# Patient Record
Sex: FEMALE | Race: WHITE | URBAN - METROPOLITAN AREA
[De-identification: names, ages, dates, MRNs, and addresses within clinical notes are randomized per-mention and may not be internally consistent; named-entity substitution may affect disease eponyms.]

---

## 2022-01-21 ENCOUNTER — OFFICE VISIT (OUTPATIENT)
Dept: URBAN - METROPOLITAN AREA CLINIC 71 | Facility: CLINIC | Age: 67
End: 2022-01-21
Payer: MEDICARE

## 2022-01-21 DIAGNOSIS — H02.889 MEIBOMIAN GLAND DYSFUNCTION OF EYE: ICD-10-CM

## 2022-01-21 DIAGNOSIS — E11.9 TYPE 2 DIABETES MELLITUS WITHOUT COMPLICATIONS: Primary | ICD-10-CM

## 2022-01-21 PROCEDURE — 99204 OFFICE O/P NEW MOD 45 MIN: CPT

## 2022-01-21 PROCEDURE — 92133 CPTRZD OPH DX IMG PST SGM ON: CPT

## 2022-01-21 ASSESSMENT — INTRAOCULAR PRESSURE
OS: 14
OD: 9

## 2022-01-21 NOTE — IMPRESSION/PLAN
Impression: Open angle with borderline findings, low risk, bilateral: H40.013. OCT performed today. IOP today: 9/14 mm Hg via [facility]
C/D ratio: 0.6 OU Pachy: Unknown OD, OS
IOP adjustment factor: Unknown OD, OS Tmax: Unknown OD, OS Last RNFL: 92/91 um Gonio: None OD, OS Family history: Mother (verify again) Surgeries: None Plan: Patient educated on today's findings. Low suspicion at this time. Continue to monitor yearly w/ DFE and OCT.

## 2022-01-26 NOTE — IMPRESSION/PLAN
Impression: Type 2 diabetes mellitus without complications: E75.3. Plan: Patient educated on today's findings. Patient to continue taking medications as directed, monitoring blood sugar regularly and attend follow-up appts w/ PCP. Patient to be monitored yearly w/ DFE and [employer].

## 2022-01-26 NOTE — IMPRESSION/PLAN
Impression: Meibomian gland dysfunction of eye: H02.889 Bilateral. Plan: Recommend OTC artifical tear use 2-4x daily w/ emphasis on QAM and QHS doses. Recommend warm compress w/ lid massage. If symptoms continue/worsen will consider prescription drug therapy.

## 2022-01-27 ENCOUNTER — OFFICE VISIT (OUTPATIENT)
Dept: URBAN - METROPOLITAN AREA CLINIC 71 | Facility: CLINIC | Age: 67
End: 2022-01-27
Payer: COMMERCIAL

## 2022-01-27 DIAGNOSIS — H25.813 COMBINED FORMS OF AGE-RELATED CATARACT, BILATERAL: ICD-10-CM

## 2022-01-27 DIAGNOSIS — H52.03 HYPERMETROPIA, BILATERAL: Primary | ICD-10-CM

## 2022-01-27 DIAGNOSIS — H40.013 OPEN ANGLE WITH BORDERLINE FINDINGS, LOW RISK, BILATERAL: ICD-10-CM

## 2022-01-27 PROCEDURE — 92012 INTRM OPH EXAM EST PATIENT: CPT | Performed by: OPTOMETRIST

## 2022-01-27 ASSESSMENT — VISUAL ACUITY
OS: 20/20
OD: 20/20

## 2022-01-27 ASSESSMENT — INTRAOCULAR PRESSURE
OS: 12
OD: 11

## 2022-01-27 NOTE — IMPRESSION/PLAN
Impression: Open angle with borderline findings, low risk, bilateral: H40.013. Dr. Jimmy Bejarano is monitoring Plan: Continue care with Dr. Valentino Grip as advised.

## 2023-07-07 ENCOUNTER — OFFICE VISIT (OUTPATIENT)
Dept: URBAN - METROPOLITAN AREA CLINIC 71 | Facility: CLINIC | Age: 68
End: 2023-07-07
Payer: MEDICARE

## 2023-07-07 DIAGNOSIS — H40.013 OPEN ANGLE WITH BORDERLINE FINDINGS, LOW RISK, BILATERAL: Primary | ICD-10-CM

## 2023-07-07 DIAGNOSIS — H25.813 COMBINED FORMS OF AGE-RELATED CATARACT, BILATERAL: ICD-10-CM

## 2023-07-07 DIAGNOSIS — H04.123 DRY EYE SYNDROME OF BILATERAL LACRIMAL GLANDS: ICD-10-CM

## 2023-07-07 DIAGNOSIS — H52.03 HYPERMETROPIA, BILATERAL: ICD-10-CM

## 2023-07-07 PROCEDURE — 92134 CPTRZ OPH DX IMG PST SGM RTA: CPT

## 2023-07-07 PROCEDURE — 92133 CPTRZD OPH DX IMG PST SGM ON: CPT

## 2023-07-07 PROCEDURE — 99213 OFFICE O/P EST LOW 20 MIN: CPT

## 2023-07-07 PROCEDURE — 76514 ECHO EXAM OF EYE THICKNESS: CPT

## 2023-07-07 ASSESSMENT — INTRAOCULAR PRESSURE
OD: 13
OS: 14

## 2023-07-07 ASSESSMENT — VISUAL ACUITY
OS: 20/40
OD: 20/20

## 2023-07-07 NOTE — IMPRESSION/PLAN
Impression: Open angle with borderline findings, low risk, bilateral: H40.013. Plan: Do not recommend gtts at this time, due to healthy ON appearance and WNL IOP Will monitor for changes yearly.